# Patient Record
Sex: FEMALE | Race: WHITE | URBAN - METROPOLITAN AREA
[De-identification: names, ages, dates, MRNs, and addresses within clinical notes are randomized per-mention and may not be internally consistent; named-entity substitution may affect disease eponyms.]

---

## 2024-03-06 ENCOUNTER — APPOINTMENT (RX ONLY)
Dept: URBAN - METROPOLITAN AREA CLINIC 341 | Facility: CLINIC | Age: 39
Setting detail: DERMATOLOGY
End: 2024-03-06

## 2024-03-06 DIAGNOSIS — L08.9 LOCAL INFECTION OF THE SKIN AND SUBCUTANEOUS TISSUE, UNSPECIFIED: ICD-10-CM | Status: INADEQUATELY CONTROLLED

## 2024-03-06 DIAGNOSIS — L91.8 OTHER HYPERTROPHIC DISORDERS OF THE SKIN: ICD-10-CM

## 2024-03-06 DIAGNOSIS — L259 CONTACT DERMATITIS AND OTHER ECZEMA, UNSPECIFIED CAUSE: ICD-10-CM | Status: INADEQUATELY CONTROLLED

## 2024-03-06 PROBLEM — L30.8 OTHER SPECIFIED DERMATITIS: Status: ACTIVE | Noted: 2024-03-06

## 2024-03-06 PROCEDURE — ? PRESCRIPTION

## 2024-03-06 PROCEDURE — ? COUNSELING

## 2024-03-06 PROCEDURE — 99204 OFFICE O/P NEW MOD 45 MIN: CPT

## 2024-03-06 PROCEDURE — ? ORDER TESTS

## 2024-03-06 PROCEDURE — ? DEFER

## 2024-03-06 PROCEDURE — ? PRESCRIPTION MEDICATION MANAGEMENT

## 2024-03-06 RX ORDER — CLOBETASOL PROPIONATE 0.5 MG/G
OINTMENT TOPICAL
Qty: 60 | Refills: 2 | Status: ERX | COMMUNITY
Start: 2024-03-06

## 2024-03-06 RX ORDER — CEPHALEXIN 500 MG/1
TABLET ORAL
Qty: 28 | Refills: 0 | Status: ERX | COMMUNITY
Start: 2024-03-06

## 2024-03-06 RX ORDER — PREDNISONE 5 MG/1
TABLET ORAL
Qty: 98 | Refills: 0 | Status: ERX | COMMUNITY
Start: 2024-03-06

## 2024-03-06 RX ADMIN — CEPHALEXIN: 500 TABLET ORAL at 00:00

## 2024-03-06 RX ADMIN — PREDNISONE: 5 TABLET ORAL at 00:00

## 2024-03-06 RX ADMIN — CLOBETASOL PROPIONATE: 0.5 OINTMENT TOPICAL at 00:00

## 2024-03-06 ASSESSMENT — LOCATION ZONE DERM
LOCATION ZONE: AXILLAE
LOCATION ZONE: LEG

## 2024-03-06 ASSESSMENT — LOCATION DETAILED DESCRIPTION DERM
LOCATION DETAILED: LEFT AXILLARY VAULT
LOCATION DETAILED: LEFT ANTERIOR DISTAL THIGH

## 2024-03-06 ASSESSMENT — LOCATION SIMPLE DESCRIPTION DERM
LOCATION SIMPLE: LEFT THIGH
LOCATION SIMPLE: LEFT AXILLARY VAULT

## 2024-03-06 NOTE — PROCEDURE: COUNSELING
Moisturizer Recommendations: Gentle moisturizing creams (not lotions) without fragrance. Reapply liberally and frequently throughout day as needed
Cleanser Recommendations: Gentle cleansers without fragrance
Detail Level: Zone
Detail Level: Detailed

## 2024-03-06 NOTE — PROCEDURE: DEFER
X Size Of Lesion In Cm (Optional): 0
Introduction Text (Please End With A Colon): The following was deferred:
Detail Level: Detailed

## 2024-03-06 NOTE — PROCEDURE: PRESCRIPTION MEDICATION MANAGEMENT
Detail Level: Zone
Render In Strict Bullet Format?: No
Initiate Treatment: Prednisone 21 day taper \\nClobetasol ointment apply taa bid
Initiate Treatment: Keflex bid x 14 days

## 2024-03-06 NOTE — PROCEDURE: ORDER TESTS
Performing Laboratory: 100
Expected Date Of Service: 03/06/2024
Bill For Surgical Tray: no
Billing Type: Third-Party Bill

## 2024-04-03 ENCOUNTER — APPOINTMENT (RX ONLY)
Dept: URBAN - METROPOLITAN AREA CLINIC 341 | Facility: CLINIC | Age: 39
Setting detail: DERMATOLOGY
End: 2024-04-03

## 2024-04-03 DIAGNOSIS — L259 CONTACT DERMATITIS AND OTHER ECZEMA, UNSPECIFIED CAUSE: ICD-10-CM | Status: IMPROVED

## 2024-04-03 DIAGNOSIS — L08.9 LOCAL INFECTION OF THE SKIN AND SUBCUTANEOUS TISSUE, UNSPECIFIED: ICD-10-CM | Status: RESOLVED

## 2024-04-03 PROBLEM — L30.8 OTHER SPECIFIED DERMATITIS: Status: ACTIVE | Noted: 2024-04-03

## 2024-04-03 PROCEDURE — ? COUNSELING

## 2024-04-03 PROCEDURE — ? PRESCRIPTION MEDICATION MANAGEMENT

## 2024-04-03 PROCEDURE — 99213 OFFICE O/P EST LOW 20 MIN: CPT

## 2024-04-03 ASSESSMENT — LOCATION ZONE DERM: LOCATION ZONE: LEG

## 2024-04-03 ASSESSMENT — LOCATION DETAILED DESCRIPTION DERM
LOCATION DETAILED: LEFT ANTERIOR PROXIMAL THIGH
LOCATION DETAILED: RIGHT ANTERIOR DISTAL THIGH

## 2024-04-03 ASSESSMENT — LOCATION SIMPLE DESCRIPTION DERM
LOCATION SIMPLE: LEFT THIGH
LOCATION SIMPLE: RIGHT THIGH

## 2024-04-03 NOTE — PROCEDURE: COUNSELING
Moisturizer Recommendations: Gentle moisturizing creams (not lotions) without fragrance. Reapply liberally and frequently throughout day as needed
Cleanser Recommendations: Gentle cleansers without fragrance
Detail Level: Zone

## 2024-04-03 NOTE — PROCEDURE: PRESCRIPTION MEDICATION MANAGEMENT
Detail Level: Zone
Render In Strict Bullet Format?: No
Discontinue Regimen: Prednisone 21 day taper
Continue Regimen: Clobetasol ointment - apply to affected areas once to twice daily for two weeks then as needed
Discontinue Regimen: Keflex bid x 14 days